# Patient Record
Sex: FEMALE | Race: WHITE
[De-identification: names, ages, dates, MRNs, and addresses within clinical notes are randomized per-mention and may not be internally consistent; named-entity substitution may affect disease eponyms.]

---

## 2017-11-20 NOTE — NUR
PT REPORTS "I ATE MY CRACKERS AND FEEL GREAT. I'D LIKE TO GO HOME." I CHECK
WITH PRIMARY RN AND SHE OKAYS PATIENT'S DISCHARGE. VERBAL DC INSTRUCTIONS
GIVEN IN PRESENCE OF NIECE AND THEY BOTH VERBALIZE UNDERSTANDING. PT DRESSES
SELF IN PRESENCE OF NIECE AND TRANSFERS SELF TO  WELL AND IS DC HOME.

## 2017-11-20 NOTE — NUR
11/20/17 1045 Galina Mckeon
TO PACU, ORAL AIRWAY IN PLACE. AIRWAY ASSIST NEEDED. NON RESPONSIVE TO
VOICE.

## 2017-11-20 NOTE — NUR
PT RETURNED FROM PACU. PT VERY DROWSY BUT RESPONDING APPROPRAITLY TO
QUESTIONS. PT DENIES PAIN AND NAUSEA. FRIEND, SLAVA, AT BEDSIDE. BED RAILS UP,
CALL LIGHT WITHIN REACH.

## 2017-11-20 NOTE — NUR
PT UP TO RESTROOM. PT REPORTS MINIMAL PAIN WITH URINATION. ABLE TO VOID 300 ML
OF CLEAR YELLOW/PINK URIN. PT DENIES PAIN AND NAUSEA. CRACKERS PROVIDED FOR
PT. PT BACK TO BED. BED RAILS UP, CALL LIGHT WITHIN REACH, FRIEND AT BEDSIDE.

## 2017-11-24 NOTE — OR
[facility]
                                    [address]
                                  [place], [place]  [address]
_________________________________________________________________________________________
                                                                 Signed   
 
 
DATE OF OPERATION:
11/20/2017
 
SURGEON:
Bridget Reynaga MD
 
PREOPERATIVE DIAGNOSES: 1. Intermittent left lower quadrant discomfort. 2. History of
left distal ureteral calculus.
 
POSTOPERATIVE DIAGNOSES: 1. Intermittent left lower quadrant discomfort. 2. History of
left distal ureteral calculus.
3. No evidence of calculus present within the left ureter or within the bladder.
 
NAMES OF PROCEDURES: 1. Urethral dilation, from 18-Togolese to 26-Togolese. 2. Diagnostic
cystoscopy.
3. Left retrograde pyelogram.
 
ANESTHESIA: General.
 
ESTIMATED BLOOD LOSS: Minimal.
 
COMPLICATIONS: None.
 
SPECIMENS: None.
 
DRAINS: None.
 
INDICATIONS FOR PROCEDURE: Sonia is a very pleasant 62-year-old female, who presented to
my clinic about a week or 
so ago with complaints of intermittent left lower quadrant discomfort.  She had presented
to the emergency department earlier in the summer time of 2017 with severe left-sided 
flank pain.  At that time, a CT scan revealed the presence of a 4 to 5 mm left 
ureterovesical junction calculus.  She presented to my Clinic reporting that she felt as 
though she had not yet passed the stone.  She underwent a KUB, which revealed no evidence
of distal ureteral calculus.  Despite this, the patient requested to undergo diagnostic 
evaluation to be sure that the stone was no longer present.
 
FINDINGS: 1. On cystoscopy, there was no evidence of any suspicious masses, lesions, or
stones 
within the bladder.  Bilateral ureteral orifices are in their normal anatomic location 
and both were effluxing clear urine.
2. Left retrograde pyelogram revealed a normal left upper tract including normal kidney 
 
    Electronically Signed By: BRIDGET REYNAGA MD  11/24/17 1149
_________________________________________________________________________________________
PATIENT NAME:     SONIA GO                
MEDICAL RECORD #: [id number]                     OPERATIVE REPORT              
          ACCT #: [id number]  
DATE OF BIRTH:   [birth year]                                       
PHYSICIAN:   BRIDGET REYNAGA MD                    REPORT #: [id number]
REPORT IS CONFIDENTIAL AND NOT TO BE RELEASED WITHOUT AUTHORIZATION
 
 
                                  [facility]
                                    [address] [place], [place]  [address]
_________________________________________________________________________________________
                                                                 Signed   
 
 
with no evidence of hydronephrosis or calyceal dilation.  There was no evidence of 
filling defects, either in the kidney or within the left ureter.
3. Urethral dilation was performed.  Attempts to pass the 22-Togolese sheath were met with 
some resistance.  Therefore, the patient was dilated from 18-Togolese to 24-Togolese without 
difficulty.
 
PROCEDURE: After informed consent was obtained, the patient was taken back to the
operating room.  
She was transferred from the [facility] to the operative room table where general anesthesia 
was induced.  She was placed in the dorsal lithotomy position and her genitalia were 
prepped and draped in the standard sterile fashion.  Attempts were made to pass the 
cystoscope through the urethra, however, I met some resistance, so decision was made to 
dilate the patient's urethra.  A straight sounds were used to dilate the urethra from 
18-Togolese to 24-Togolese, this occurred without difficulty.  I then inserted the cystoscope
via the 22-Togolese sheath through the urethra into the patient's bladder.  Diagnostic 
cystoscopy was then performed.  A cone-tipped catheter was then advanced to the left 
ureteral orifice and a left retrograde pyelogram was performed.  Multiple pictures were 
taken.  Please see the above findings.  Once I was satisfied, there was no evidence of 
any filling defects within the entire kidney or left ureter.  The patient's bladder was 
then drained and the cystoscope was removed.  The procedure was terminated.  The patient 
tolerated the procedure well without any complication.  She will now be transferred to 
the postanesthesia care unit in stable condition.
 
 
 
 
________________________________________
 
 
MD SHARON Peace/RADAMESL
Job #:  [id number]
DD:  11/20/2017 11:08:39
DT:  11/20/2017 16:44:58
 
 
 
 
 
 
    Electronically Signed By: BRIDGET REYNAGA MD  11/24/17 1149
_________________________________________________________________________________________
PATIENT NAME:     SONIA GO                
MEDICAL RECORD #: [id number]                     OPERATIVE REPORT              
          ACCT #: [id number]  
DATE OF BIRTH:   [birth year]                                       
PHYSICIAN:   BRIDGET REYNAGA MD                    REPORT #: [id number]
REPORT IS CONFIDENTIAL AND NOT TO BE RELEASED WITHOUT AUTHORIZATION

## 2020-02-24 NOTE — NUR
THIS RN NOTIFIES DR. SANTANA OF PT PAIN. DR. SANTANA VERBALLY ORDERS SINGLE VIEW
UPRIGHT CXR, ORDERS ENTERED.
1640: IMAGING IN PT ROOM. SPOUSE IN HALLWAY.

## 2020-02-25 ENCOUNTER — HOSPITAL ENCOUNTER (OUTPATIENT)
Dept: HOSPITAL 46 - OPS | Age: 65
Discharge: HOME | End: 2020-02-25
Attending: OTOLARYNGOLOGY
Payer: COMMERCIAL

## 2020-02-25 VITALS — WEIGHT: 163.01 LBS | BODY MASS INDEX: 26.2 KG/M2 | HEIGHT: 66 IN

## 2020-02-25 DIAGNOSIS — E03.9: ICD-10-CM

## 2020-02-25 DIAGNOSIS — Z79.899: ICD-10-CM

## 2020-02-25 DIAGNOSIS — N20.0: ICD-10-CM

## 2020-02-25 DIAGNOSIS — Z79.51: ICD-10-CM

## 2020-02-25 DIAGNOSIS — J32.4: Primary | ICD-10-CM

## 2020-02-25 DIAGNOSIS — Z87.891: ICD-10-CM

## 2020-02-25 DIAGNOSIS — Z87.09: ICD-10-CM

## 2020-02-25 DIAGNOSIS — Z88.5: ICD-10-CM

## 2020-02-25 DIAGNOSIS — E78.00: ICD-10-CM

## 2020-02-25 PROCEDURE — A9270 NON-COVERED ITEM OR SERVICE: HCPCS

## 2020-02-25 PROCEDURE — 099R0ZZ DRAINAGE OF LEFT MAXILLARY SINUS, OPEN APPROACH: ICD-10-PCS | Performed by: OTOLARYNGOLOGY

## 2020-02-25 NOTE — OR
[facility]
                                    [address] [place], [place]  [address]
_________________________________________________________________________________________
                                                                 Draft    
 
 
DATE OF OPERATION:
02/25/2020
 
SURGEON:
Deejay Lane MD
 
PREOPERATIVE DIAGNOSIS:
Chronic left maxillary sinusitis.
 
POSTOPERATIVE DIAGNOSIS:
Chronic left maxillary sinusitis.
 
PROCEDURE PERFORMED:
Left maxillary sinusotomy.
 
ANESTHESIA:
General LMA, MADELEINE Hernandez.
 
PREOPERATIVE HISTORY:
Sonia is a 64-year-old lady with chronic sinus problems.  This basically was worsened
after a left maxillary molar tooth extraction 18 months ago.  She has had chronic
drainage, abnormal dental x-rays, abnormal CAT scan despite appropriate antibiotics,
medications, etc., showing a completely opacified left maxillary sinus.  She is taken to
the operating for the above-mentioned procedures. 
 
OPERATIVE PROCEDURE AND FINDINGS:
After informed consent, the patient was taken to the operating room, placed in supine
position where general LMA anesthesia was induced.  The patient and procedure were
verified.  The patient received preoperative intranasal oxymetazoline and intravenous
Ancef.  The nasal cavity was inspected with the headlight and speculum.  The left side
middle turbinate was medialized.  An antrostomy was made with a curved ring curette and
suction produced copious mucopurulent drainage from the sinus.  This was all suctioned
clear.  The antrostomy was widened with the Kat.  There was some prominent
arterial bleeding posteriorly near the nasal antral window site.  This was controlled
with suction cautery.  Hemostasis was verified.  Adequate antrostomy was verified.  The
sinus mucosa was curetted.  The tissue was sent to pathology in formalin.  After
hemostasis was verified, the pharynx was suctioned clear of blood secretions.  The
patient was then awakened, extubated, transported to recovery room in good condition.
No complications. 
 
BLOOD LOSS:
Minimal.
 
                                                                                    
_________________________________________________________________________________________
PATIENT NAME:     SONIA GO                
MEDICAL RECORD #: [id number]            OPERATIVE REPORT              
          ACCT #: [id number]  
DATE OF BIRTH:   [birth year]            REPORT #: [id number]      
PHYSICIAN:        DEEJAY LANE MD              
PCP:              PATRICIA AVERY MD                
REPORT IS CONFIDENTIAL AND NOT TO BE RELEASED WITHOUT AUTHORIZATION
 
 
                                  [address]
                                  [place], [place]  [address]
_________________________________________________________________________________________
                                                                 Draft    
 
 
 
SPECIMEN:
To pathology.
 
DRAINS:
No drains.
 
PACKING:
No packing.
 
 
 
            ________________________________________
            Deejay Lane MD GC/KYRA
Job #:  [id number]
DD:  02/25/2020 10:39:08
DT:  02/25/2020 16:39:54
 
 
Copies:                                
~
 
 
 
 
 
 
 
 
 
 
 
 
 
 
 
 
 
 
 
                                                                                    
_________________________________________________________________________________________
PATIENT NAME:     SONIA GO                
MEDICAL RECORD #: [id number]            OPERATIVE REPORT              
          ACCT #: [id number]  
DATE OF BIRTH:   [birth year]            REPORT #: [id number]      
PHYSICIAN:        DEEJAY LANE MD              
PCP:              PATRICIA AVERY MD                
REPORT IS CONFIDENTIAL AND NOT TO BE RELEASED WITHOUT AUTHORIZATION

## 2020-02-25 NOTE — NUR
PATIENT REPORTED THROBBING PAIN, WOULD LIKE IBUPROFEN HOWEVER TOO SOON FOR
ANY. PATIENT STATED " THIS COULD BE A CAFFEINE HEADACHE". PROVIDED PATIENT
WITH ICE TEA. PATIENT SITTING UP IN BED. GAUZE TO NOSE C/D/I.

## 2020-02-25 NOTE — NUR
02/25/20 1040 Anu Albarran
1034-PATIENT ARRIVED TO PACU ON 8L MASK NONAROUSABLE. ORAL AIRWAY IN
PLACE. STUDENT RN HOLDING AIRWAY TO MAINTAIN OPEN. RR 10. SR.  IVF
INFUSING. DRESSING TO NOSE CDI WITH GAUZE

## 2020-02-25 NOTE — NUR
LEROY 1435: PT IS UP TO THE BATHROOM WITH STANDBY ASSIST. SHE REPORTS BEING ABLE
TO EMPTY HER BLADDER AND FEELS BETTER AFTER VOIDING. SHE INDICATES THAT SHE
WOULD LIKE TO GO HOME. SHE IS EDUCATED ON HOW TO BEST DRESS HERSELF AND TO
OPEN HER CURTAIN WHEN SHE IS READY.

## 2020-02-25 NOTE — NUR
CALL TO DR. LANE TO REQUEST IBUPROFEN. MADE AWARE PATIENT RECIEVED TORADOL
AT DURING SURGERY. VERBALIZED ONE TIME ORDER 400-800 MG IBUPROFEN PO. CALL TO
PHARMACY TO CHECK SAFETY OF IBUPROFEN BEING ADMINISTERED NOW, TALKED WITH NICOLAS
WHO CHECKED PATIENT AGE AND LABS. NICOLAS PHARMACIST VERBALIZED 600 MG PO
IBUPROFEN ONE TIME WOULD BE OKAY AT THIS TIME. MADE PATIENT AWARE, WAITING FOR
MEDICATION TO BE VERIFIED BY PHARMACY.

## 2020-02-25 NOTE — NUR
PROVIDED PATIENT WITH ICE TEA, PATIENT SPIT UP BLOOD CLOTS. NOTED PATIENT
SNIFFLING, INTRUCTED PATIENT TO STOP. REINFORCED GAUZE WITH MORE GAUZE AND
APPLIED PRESSURE. PATIENT REPORTED BLEEDING STOPPED AFTER 2 MINUTES OF
APPLYING PRESSURE. REASSURED PATIENT NOSE WOULD BLEED ON AND OFF. PATIENT
REPORTS PRESSURE IN HEAD IMPROVED AFTER CLOT CAME OUT, STATING " SO MUCH
PRESSURE IS GONE".

## 2020-02-26 NOTE — PATH
[facility]
                                    [address] [place], [place]  [address]
_________________________________________________________________________________________
                                                                 Signed   
 
 
 
SPECIMEN(S): A LEFT MAXILLARY SINUS CONTENTS
 
SPECIMEN SOURCE:
A. LEFT MAXILLARY SINUS CONTENTS
 
CLINICAL HISTORY:
Pre:  Chronic pansinusitis. Post:  Same.
 
FINAL PATHOLOGIC DIAGNOSIS:
Sinus contents, left maxillary sinus, excision:
-  Sinonasal tissue with chronic sinusitis.
NAL:cml:C2NR
 
MICROSCOPIC EXAMINATION:
Histologic sections of all submitted blocks are examined by light microscopy.  
These findings, together with the gross examination, support the pathologic 
diagnosis. 
 
GROSS DESCRIPTION:
The specimen, labeled "JM, left maxillary sinus contents," per requisition, is 
received in formalin and consists of tan-pink soft tissue fragments with clot 
material measuring 1.7 x 1.2 x 0.3 cm in 
aggregate.  The specimen is filtered and entirely submitted in cassette (A1).
AT (under the direct supervision of a pathologist)
The Gross Description was prepared using a voice recognition system.  The 
report was reviewed for accuracy; however, sound-alike word errors, addition 
and/or deletions may occur.  If there is any 
question about this report, please contact Client Services.
 
PERFORMING LABORATORY:
The technical component was performed by [email or web address], [address] [place] [id number] (Medical Director: Jyothi Payan MD; CLIA# [id number]). 
[email or web address], [address] [address] (CLIA# [id number]). 
 
Diagnostician:  Geno Bains MD
Pathologist
Electronically Signed 02/26/2020
 
 
 
                                                                                    
_________________________________________________________________________________________
PATIENT NAME:     CARLY GO                
MEDICAL RECORD #: [id number]            PATHOLOGY                     
          ACCT #: [id number]       ACCESSION #: [id number]     
DATE OF BIRTH:   [birth year]            REPORT #: [id number]       
PHYSICIAN:        [facility]              
PCP:              PATRICIA AVERY MD                
REPORT IS CONFIDENTIAL AND NOT TO BE RELEASED WITHOUT AUTHORIZATION
 
 
                                  [address] [place], [place]  [address]
_________________________________________________________________________________________
                                                                 Signed   
 
 
Copies:                                
~
 
 
 
 
 
 
 
 
 
 
 
 
 
 
 
 
 
 
 
 
 
 
 
 
 
 
 
 
 
 
 
 
 
 
 
 
 
 
 
 
 
                                                                                    
_________________________________________________________________________________________
PATIENT NAME:     CARLY GO                
MEDICAL RECORD #: [id number]            PATHOLOGY                     
          ACCT #: [id number]       ACCESSION #: [id number]     
DATE OF BIRTH:   [birth year]            REPORT #: [id number]       
PHYSICIAN:        [facility]              
PCP:              PATRICIA AVERY MD                
REPORT IS CONFIDENTIAL AND NOT TO BE RELEASED WITHOUT AUTHORIZATION

## 2021-12-27 ENCOUNTER — HOSPITAL ENCOUNTER (EMERGENCY)
Dept: HOSPITAL 46 - ED | Age: 66
Discharge: HOME | End: 2021-12-27
Payer: COMMERCIAL

## 2021-12-27 VITALS — WEIGHT: 170 LBS | HEIGHT: 66 IN | BODY MASS INDEX: 27.32 KG/M2

## 2021-12-27 DIAGNOSIS — E03.9: ICD-10-CM

## 2021-12-27 DIAGNOSIS — Z87.891: ICD-10-CM

## 2021-12-27 DIAGNOSIS — U07.1: Primary | ICD-10-CM

## 2021-12-27 DIAGNOSIS — J45.909: ICD-10-CM

## 2021-12-27 DIAGNOSIS — Z23: ICD-10-CM

## 2021-12-27 DIAGNOSIS — Z79.899: ICD-10-CM

## 2021-12-27 DIAGNOSIS — Z88.5: ICD-10-CM

## 2021-12-27 PROCEDURE — M0243 CASIRIVI AND IMDEVI INFUSION: HCPCS
